# Patient Record
Sex: FEMALE | Race: WHITE | NOT HISPANIC OR LATINO | Employment: UNEMPLOYED | URBAN - NONMETROPOLITAN AREA
[De-identification: names, ages, dates, MRNs, and addresses within clinical notes are randomized per-mention and may not be internally consistent; named-entity substitution may affect disease eponyms.]

---

## 2019-02-11 ENCOUNTER — OFFICE VISIT (OUTPATIENT)
Dept: CARDIOLOGY | Facility: CLINIC | Age: 59
End: 2019-02-11

## 2019-02-11 VITALS
WEIGHT: 153.5 LBS | OXYGEN SATURATION: 98 % | BODY MASS INDEX: 27.2 KG/M2 | SYSTOLIC BLOOD PRESSURE: 176 MMHG | HEART RATE: 93 BPM | HEIGHT: 63 IN | DIASTOLIC BLOOD PRESSURE: 77 MMHG

## 2019-02-11 DIAGNOSIS — I73.9 PERIPHERAL VASCULAR DISEASE (HCC): Primary | ICD-10-CM

## 2019-02-11 PROCEDURE — 99204 OFFICE O/P NEW MOD 45 MIN: CPT | Performed by: INTERNAL MEDICINE

## 2019-02-11 RX ORDER — ASPIRIN 81 MG/1
TABLET ORAL
Refills: 1 | Status: ON HOLD | COMMUNITY
Start: 2019-01-28 | End: 2019-02-14

## 2019-02-11 RX ORDER — ASCORBIC ACID 500 MG
TABLET ORAL 2 TIMES DAILY
Refills: 0 | COMMUNITY
Start: 2019-02-01

## 2019-02-11 NOTE — PROGRESS NOTES
[facility] CARDIOLOGY  [address]  [place] [address]  Phone: [phone]  Fax: [phone]    02/11/2019    Chief Complaint   Patient presents with   • Peripheral Vascular Disease        History:   Irish Figueroa is a 58 y.o. female seen in follow-up for PAD and claudication. I previously treated this patient at [facility] about two years ago. Dr. Ewing advised her to come back because she has a wound on the bottom of her left foot and it is not wanting to heal completely.   She had prior procedure on the right SFA at [facility] which we will obtain op report.     Past Medical History:   Diagnosis Date   • Hypertension        No past surgical history on file.     Past Social History:  Social History     Socioeconomic History   • Marital status: Single     Spouse name: Not on file   • Number of children: Not on file   • Years of education: Not on file   • Highest education level: Not on file   Tobacco Use   • Smoking status: Never Smoker   • Smokeless tobacco: Never Used   Substance and Sexual Activity   • Alcohol use: No     Frequency: Never   • Drug use: No   • Sexual activity: Defer       Past Family History:  Family History   Problem Relation Age of Onset   • Heart disease Mother    • Stroke Father    • Diabetes Maternal Grandmother    • Diabetes Paternal Grandmother        Review of Systems:   Please see HPI  Constitution: No chills, no rigors, no unexplained weight loss or weight gain  Eyes:  No diplopia, no blurred vision, no loss of vision, conjunctiva is pink and sclera is anicteric  ENT:  No tinnitus, no otorrhea, no epistaxis, no sore throat   Respiratory: No cough, no hemoptysis  Cardiovascular: see HPI  Gastrointestinal: No nausea, no vomiting, no hematemesis, no diarrhea or constipation, no melena  Genitourinary: No frequency of dysuria no hematuria  Integument: No pruritis and  no skin rash  Hematologic / Lymphatic: No excessive bleeding, easy bruising, fatigue,  lymphadenopathy and petechiae  Musculoskeletal: No joint pain, joint stiffness, joint swelling, muscle pain, muscle weakness and neck pain  Neurological: No dizziness, headaches, light headedness, seizures and vertigo  Endocrine: No frequent urination and nocturia, temperature intolerance, weight gain, unintended and weight loss, unintended      Current Outpatient Medications   Medication Sig Dispense Refill   • ASPIRIN ADULT LOW STRENGTH 81 MG EC tablet   1   • vitamin C (ASCORBIC ACID) 500 MG tablet   0     No current facility-administered medications for this visit.         Allergies   Allergen Reactions   • Ampicillin Hives   • Codeine Hives       Objective:  Vitals:    02/11/19 1522   BP: 176/77   Pulse: 93   SpO2: 98%     Comfortable NAD  PERRL, conjunctiva clear  Neck supple, no JVD or thyromegaly appreciated  S1/S2 RRR, no m/r/g  Lungs CTA B, normal effort  Abdomen S/NT/ND (+) BS, no HSM appreciated  Extremities warm, no clubbing, cyanosis, or edema  Normal gait  No visible or palpable skin lesions  A/Ox4, mood and affect appropriate  Pulse exam:    Feet are warm bilateral  1+ edema bilateral  Capillary refill is normal  No evidence of ulceration or color change of the toes  PULSES  Right DP and PT are 1+ and Left DP and PT are 2+    DATA:             Procedures       A/P:  Severe PAD with ischemic ulcer on the left foot - critical limb ischemia  Prev hx of right SFA intervention 2 yr ago  Essential Hypertension    Plan  Schedule peripheral angiogram and runoff with intention to treat.    Patient's Body mass index is 27.19 kg/m². BMI is within normal parameters. No follow-up required..       No diagnosis found.     Thank you for allowing me to participate in the care of Irish Figueroa. Feel free to contact me directly with any further questions or concerns.

## 2019-02-11 NOTE — H&P (VIEW-ONLY)
[facility] CARDIOLOGY  [address]  [place] [address]  Phone: [phone]  Fax: [phone]    02/11/2019    Chief Complaint   Patient presents with   • Peripheral Vascular Disease        History:   Irish Figueroa is a 58 y.o. female seen in follow-up for PAD and claudication. I previously treated this patient at [place] about two years ago. Dr. Ewing advised her to come back because she has a wound on the bottom of her left foot and it is not wanting to heal completely.   She had prior procedure on the right SFA at [facility] which we will obtain op report.     Past Medical History:   Diagnosis Date   • Hypertension        No past surgical history on file.     Past Social History:  Social History     Socioeconomic History   • Marital status: Single     Spouse name: Not on file   • Number of children: Not on file   • Years of education: Not on file   • Highest education level: Not on file   Tobacco Use   • Smoking status: Never Smoker   • Smokeless tobacco: Never Used   Substance and Sexual Activity   • Alcohol use: No     Frequency: Never   • Drug use: No   • Sexual activity: Defer       Past Family History:  Family History   Problem Relation Age of Onset   • Heart disease Mother    • Stroke Father    • Diabetes Maternal Grandmother    • Diabetes Paternal Grandmother        Review of Systems:   Please see HPI  Constitution: No chills, no rigors, no unexplained weight loss or weight gain  Eyes:  No diplopia, no blurred vision, no loss of vision, conjunctiva is pink and sclera is anicteric  ENT:  No tinnitus, no otorrhea, no epistaxis, no sore throat   Respiratory: No cough, no hemoptysis  Cardiovascular: see HPI  Gastrointestinal: No nausea, no vomiting, no hematemesis, no diarrhea or constipation, no melena  Genitourinary: No frequency of dysuria no hematuria  Integument: No pruritis and  no skin rash  Hematologic / Lymphatic: No excessive bleeding, easy bruising, fatigue,  lymphadenopathy and petechiae  Musculoskeletal: No joint pain, joint stiffness, joint swelling, muscle pain, muscle weakness and neck pain  Neurological: No dizziness, headaches, light headedness, seizures and vertigo  Endocrine: No frequent urination and nocturia, temperature intolerance, weight gain, unintended and weight loss, unintended      Current Outpatient Medications   Medication Sig Dispense Refill   • ASPIRIN ADULT LOW STRENGTH 81 MG EC tablet   1   • vitamin C (ASCORBIC ACID) 500 MG tablet   0     No current facility-administered medications for this visit.         Allergies   Allergen Reactions   • Ampicillin Hives   • Codeine Hives       Objective:  Vitals:    02/11/19 1522   BP: 176/77   Pulse: 93   SpO2: 98%     Comfortable NAD  PERRL, conjunctiva clear  Neck supple, no JVD or thyromegaly appreciated  S1/S2 RRR, no m/r/g  Lungs CTA B, normal effort  Abdomen S/NT/ND (+) BS, no HSM appreciated  Extremities warm, no clubbing, cyanosis, or edema  Normal gait  No visible or palpable skin lesions  A/Ox4, mood and affect appropriate  Pulse exam:    Feet are warm bilateral  1+ edema bilateral  Capillary refill is normal  No evidence of ulceration or color change of the toes  PULSES  Right DP and PT are 1+ and Left DP and PT are 2+    DATA:             Procedures       A/P:  Severe PAD with ischemic ulcer on the left foot - critical limb ischemia  Prev hx of right SFA intervention 2 yr ago  Essential Hypertension    Plan  Schedule peripheral angiogram and runoff with intention to treat.    Patient's Body mass index is 27.19 kg/m². BMI is within normal parameters. No follow-up required..       No diagnosis found.     Thank you for allowing me to participate in the care of Irish Figueroa. Feel free to contact me directly with any further questions or concerns.

## 2019-02-13 PROBLEM — I73.9 PERIPHERAL VASCULAR DISEASE (HCC): Status: ACTIVE | Noted: 2019-02-13

## 2019-02-14 ENCOUNTER — HOSPITAL ENCOUNTER (OUTPATIENT)
Facility: HOSPITAL | Age: 59
Discharge: HOME | End: 2019-02-15
Attending: INTERNAL MEDICINE | Admitting: INTERNAL MEDICINE

## 2019-02-14 DIAGNOSIS — I73.9 PERIPHERAL VASCULAR DISEASE (HCC): ICD-10-CM

## 2019-02-14 DIAGNOSIS — E83.42 HYPOMAGNESEMIA: ICD-10-CM

## 2019-02-14 DIAGNOSIS — E87.6 HYPOKALEMIA: Primary | ICD-10-CM

## 2019-02-14 LAB
A-A DO2: 33.6 MMHG (ref 0–300)
ACT BLD: 169 SECONDS (ref 82–152)
ALBUMIN SERPL-MCNC: 3.5 G/DL (ref 3.5–5)
ALBUMIN/GLOB SERPL: 0.9 G/DL (ref 1.5–2.5)
ALP SERPL-CCNC: 148 U/L (ref 35–104)
ALT SERPL W P-5'-P-CCNC: 23 U/L (ref 10–36)
ANION GAP SERPL CALCULATED.3IONS-SCNC: 6.7 MMOL/L (ref 3.6–11.2)
ANION GAP SERPL CALCULATED.3IONS-SCNC: 8.9 MMOL/L (ref 3.6–11.2)
ARTERIAL PATENCY WRIST A: POSITIVE
AST SERPL-CCNC: 25 U/L (ref 10–30)
ATMOSPHERIC PRESS: 728 MMHG
BASE EXCESS BLDA CALC-SCNC: 5.3 MMOL/L
BASOPHILS # BLD AUTO: 0.04 10*3/MM3 (ref 0–0.3)
BASOPHILS NFR BLD AUTO: 0.4 % (ref 0–2)
BDY SITE: ABNORMAL
BILIRUB SERPL-MCNC: 0.5 MG/DL (ref 0.2–1.8)
BODY TEMPERATURE: 98.6 C
BUN BLD-MCNC: 8 MG/DL (ref 7–21)
BUN BLD-MCNC: 9 MG/DL (ref 7–21)
BUN/CREAT SERPL: 11.4 (ref 7–25)
BUN/CREAT SERPL: 9.9 (ref 7–25)
CALCIUM SPEC-SCNC: 8.8 MG/DL (ref 7.7–10)
CALCIUM SPEC-SCNC: 9.3 MG/DL (ref 7.7–10)
CHLORIDE SERPL-SCNC: 95 MMOL/L (ref 99–112)
CHLORIDE SERPL-SCNC: 95 MMOL/L (ref 99–112)
CO2 SERPL-SCNC: 31.3 MMOL/L (ref 24.3–31.9)
CO2 SERPL-SCNC: 33.1 MMOL/L (ref 24.3–31.9)
COHGB MFR BLD: 1.1 % (ref 0–5)
CREAT BLD-MCNC: 0.79 MG/DL (ref 0.43–1.29)
CREAT BLD-MCNC: 0.81 MG/DL (ref 0.43–1.29)
DEPRECATED RDW RBC AUTO: 39.9 FL (ref 37–54)
EOSINOPHIL # BLD AUTO: 0.4 10*3/MM3 (ref 0–0.7)
EOSINOPHIL NFR BLD AUTO: 3.9 % (ref 0–5)
ERYTHROCYTE [DISTWIDTH] IN BLOOD BY AUTOMATED COUNT: 12.4 % (ref 11.5–14.5)
GFR SERPL CREATININE-BSD FRML MDRD: 73 ML/MIN/1.73
GFR SERPL CREATININE-BSD FRML MDRD: 75 ML/MIN/1.73
GLOBULIN UR ELPH-MCNC: 3.7 GM/DL
GLUCOSE BLD-MCNC: 271 MG/DL (ref 70–110)
GLUCOSE BLD-MCNC: 380 MG/DL (ref 70–110)
GLUCOSE BLDC GLUCOMTR-MCNC: 283 MG/DL (ref 70–130)
HBA1C MFR BLD: 9.2 % (ref 4.5–5.7)
HCG INTACT+B SERPL-ACNC: <2 MIU/ML (ref 0–5)
HCO3 BLDA-SCNC: 28.9 MMOL/L (ref 22–26)
HCT VFR BLD AUTO: 34.6 % (ref 37–47)
HCT VFR BLD CALC: 31 % (ref 37–47)
HGB BLD-MCNC: 11.4 G/DL (ref 12–16)
HGB BLDA-MCNC: 10.7 G/DL (ref 12–16)
HOROWITZ INDEX BLD+IHG-RTO: 21 %
IMM GRANULOCYTES # BLD AUTO: 0.03 10*3/MM3 (ref 0–0.03)
IMM GRANULOCYTES NFR BLD AUTO: 0.3 % (ref 0–0.5)
LYMPHOCYTES # BLD AUTO: 1.65 10*3/MM3 (ref 1–3)
LYMPHOCYTES NFR BLD AUTO: 16.1 % (ref 21–51)
MAGNESIUM SERPL-MCNC: 1.5 MG/DL (ref 1.7–2.6)
MCH RBC QN AUTO: 29.6 PG (ref 27–33)
MCHC RBC AUTO-ENTMCNC: 32.9 G/DL (ref 33–37)
MCV RBC AUTO: 89.9 FL (ref 80–94)
METHGB BLD QL: 0.2 % (ref 0–3)
MODALITY: ABNORMAL
MONOCYTES # BLD AUTO: 0.59 10*3/MM3 (ref 0.1–0.9)
MONOCYTES NFR BLD AUTO: 5.7 % (ref 0–10)
NEUTROPHILS # BLD AUTO: 7.57 10*3/MM3 (ref 1.4–6.5)
NEUTROPHILS NFR BLD AUTO: 73.6 % (ref 30–70)
OSMOLALITY SERPL CALC.SUM OF ELEC: 280.7 MOSM/KG (ref 273–305)
OSMOLALITY SERPL CALC.SUM OF ELEC: 281.7 MOSM/KG (ref 273–305)
OXYHGB MFR BLDV: 91.7 % (ref 85–100)
PCO2 BLDA: 38.6 MM HG (ref 35–45)
PH BLDA: 7.49 PH UNITS (ref 7.35–7.45)
PLATELET # BLD AUTO: 389 10*3/MM3 (ref 130–400)
PMV BLD AUTO: 9 FL (ref 6–10)
PO2 BLDA: 63.2 MM HG (ref 80–100)
POTASSIUM BLD-SCNC: 2.6 MMOL/L (ref 3.5–5.3)
POTASSIUM BLD-SCNC: 2.8 MMOL/L (ref 3.5–5.3)
POTASSIUM BLD-SCNC: 3.3 MMOL/L (ref 3.5–5.3)
PROT SERPL-MCNC: 7.2 G/DL (ref 6–8)
RBC # BLD AUTO: 3.85 10*6/MM3 (ref 4.2–5.4)
SAO2 % BLDCOA: 92.9 % (ref 90–100)
SODIUM BLD-SCNC: 133 MMOL/L (ref 135–153)
SODIUM BLD-SCNC: 137 MMOL/L (ref 135–153)
WBC NRBC COR # BLD: 10.28 10*3/MM3 (ref 4.5–12.5)

## 2019-02-14 PROCEDURE — C1751 CATH, INF, PER/CENT/MIDLINE: HCPCS | Performed by: INTERNAL MEDICINE

## 2019-02-14 PROCEDURE — C1714 CATH, TRANS ATHERECTOMY, DIR: HCPCS | Performed by: INTERNAL MEDICINE

## 2019-02-14 PROCEDURE — 84702 CHORIONIC GONADOTROPIN TEST: CPT | Performed by: INTERNAL MEDICINE

## 2019-02-14 PROCEDURE — C2623 CATH, TRANSLUMIN, DRUG-COAT: HCPCS | Performed by: INTERNAL MEDICINE

## 2019-02-14 PROCEDURE — 99220 PR INITIAL OBSERVATION CARE/DAY 70 MINUTES: CPT | Performed by: INTERNAL MEDICINE

## 2019-02-14 PROCEDURE — 36600 WITHDRAWAL OF ARTERIAL BLOOD: CPT | Performed by: INTERNAL MEDICINE

## 2019-02-14 PROCEDURE — C1769 GUIDE WIRE: HCPCS | Performed by: INTERNAL MEDICINE

## 2019-02-14 PROCEDURE — 25010000002 HEPARIN (PORCINE) PER 1000 UNITS: Performed by: INTERNAL MEDICINE

## 2019-02-14 PROCEDURE — 75625 CONTRAST EXAM ABDOMINL AORTA: CPT | Performed by: INTERNAL MEDICINE

## 2019-02-14 PROCEDURE — 25010000002 MAGNESIUM SULFATE 2 GM/50ML SOLUTION: Performed by: INTERNAL MEDICINE

## 2019-02-14 PROCEDURE — 83050 HGB METHEMOGLOBIN QUAN: CPT | Performed by: INTERNAL MEDICINE

## 2019-02-14 PROCEDURE — C1894 INTRO/SHEATH, NON-LASER: HCPCS | Performed by: INTERNAL MEDICINE

## 2019-02-14 PROCEDURE — 85025 COMPLETE CBC W/AUTO DIFF WBC: CPT | Performed by: INTERNAL MEDICINE

## 2019-02-14 PROCEDURE — 84132 ASSAY OF SERUM POTASSIUM: CPT | Performed by: INTERNAL MEDICINE

## 2019-02-14 PROCEDURE — 85347 COAGULATION TIME ACTIVATED: CPT

## 2019-02-14 PROCEDURE — 82375 ASSAY CARBOXYHB QUANT: CPT | Performed by: INTERNAL MEDICINE

## 2019-02-14 PROCEDURE — C1760 CLOSURE DEV, VASC: HCPCS | Performed by: INTERNAL MEDICINE

## 2019-02-14 PROCEDURE — 25010000002 FENTANYL CITRATE (PF) 100 MCG/2ML SOLUTION: Performed by: INTERNAL MEDICINE

## 2019-02-14 PROCEDURE — C1887 CATHETER, GUIDING: HCPCS | Performed by: INTERNAL MEDICINE

## 2019-02-14 PROCEDURE — 82805 BLOOD GASES W/O2 SATURATION: CPT | Performed by: INTERNAL MEDICINE

## 2019-02-14 PROCEDURE — 37225 PR REVSC OPN/PRQ FEM/POP W/ATHRC/ANGIOP SM VSL: CPT | Performed by: INTERNAL MEDICINE

## 2019-02-14 PROCEDURE — 25010000003 POTASSIUM CHLORIDE 10 MEQ/100ML SOLUTION: Performed by: INTERNAL MEDICINE

## 2019-02-14 PROCEDURE — 83036 HEMOGLOBIN GLYCOSYLATED A1C: CPT | Performed by: PHYSICIAN ASSISTANT

## 2019-02-14 PROCEDURE — 63710000001 INSULIN DETEMIR PER 5 UNITS: Performed by: INTERNAL MEDICINE

## 2019-02-14 PROCEDURE — 84244 ASSAY OF RENIN: CPT | Performed by: INTERNAL MEDICINE

## 2019-02-14 PROCEDURE — 75716 ARTERY X-RAYS ARMS/LEGS: CPT | Performed by: INTERNAL MEDICINE

## 2019-02-14 PROCEDURE — 63710000001 INSULIN ASPART PER 5 UNITS: Performed by: PHYSICIAN ASSISTANT

## 2019-02-14 PROCEDURE — 93010 ELECTROCARDIOGRAM REPORT: CPT | Performed by: INTERNAL MEDICINE

## 2019-02-14 PROCEDURE — 25010000002 MIDAZOLAM PER 1 MG: Performed by: INTERNAL MEDICINE

## 2019-02-14 PROCEDURE — C1884 EMBOLIZATION PROTECT SYST: HCPCS | Performed by: INTERNAL MEDICINE

## 2019-02-14 PROCEDURE — 82962 GLUCOSE BLOOD TEST: CPT

## 2019-02-14 PROCEDURE — 80053 COMPREHEN METABOLIC PANEL: CPT | Performed by: INTERNAL MEDICINE

## 2019-02-14 PROCEDURE — 93005 ELECTROCARDIOGRAM TRACING: CPT | Performed by: PHYSICIAN ASSISTANT

## 2019-02-14 PROCEDURE — 83735 ASSAY OF MAGNESIUM: CPT | Performed by: INTERNAL MEDICINE

## 2019-02-14 PROCEDURE — G0378 HOSPITAL OBSERVATION PER HR: HCPCS

## 2019-02-14 PROCEDURE — 82088 ASSAY OF ALDOSTERONE: CPT | Performed by: INTERNAL MEDICINE

## 2019-02-14 RX ORDER — IBUPROFEN 400 MG/1
600 TABLET ORAL AS NEEDED
Status: ON HOLD | COMMUNITY
End: 2019-02-14

## 2019-02-14 RX ORDER — TRAMADOL HYDROCHLORIDE 50 MG/1
50 TABLET ORAL 3 TIMES DAILY
COMMUNITY

## 2019-02-14 RX ORDER — SODIUM CHLORIDE 9 MG/ML
100 INJECTION, SOLUTION INTRAVENOUS ONCE
Status: DISCONTINUED | OUTPATIENT
Start: 2019-02-14 | End: 2019-02-14

## 2019-02-14 RX ORDER — ROSUVASTATIN CALCIUM 10 MG/1
10 TABLET, COATED ORAL DAILY
Refills: 1 | COMMUNITY
Start: 2019-01-28

## 2019-02-14 RX ORDER — LIDOCAINE HYDROCHLORIDE 20 MG/ML
INJECTION, SOLUTION INFILTRATION; PERINEURAL AS NEEDED
Status: DISCONTINUED | OUTPATIENT
Start: 2019-02-14 | End: 2019-02-14 | Stop reason: HOSPADM

## 2019-02-14 RX ORDER — AMLODIPINE BESYLATE 10 MG/1
10 TABLET ORAL DAILY
COMMUNITY

## 2019-02-14 RX ORDER — INSULIN GLARGINE 100 [IU]/ML
30 INJECTION, SOLUTION SUBCUTANEOUS DAILY
Status: ON HOLD | COMMUNITY
End: 2019-02-14

## 2019-02-14 RX ORDER — METAXALONE 800 MG/1
400 TABLET ORAL 2 TIMES DAILY
Status: ON HOLD | COMMUNITY
End: 2019-02-14

## 2019-02-14 RX ORDER — MAGNESIUM SULFATE HEPTAHYDRATE 40 MG/ML
4 INJECTION, SOLUTION INTRAVENOUS AS NEEDED
Status: DISCONTINUED | OUTPATIENT
Start: 2019-02-14 | End: 2019-02-15 | Stop reason: HOSPADM

## 2019-02-14 RX ORDER — HEPARIN SODIUM 5000 [USP'U]/ML
5000 INJECTION, SOLUTION INTRAVENOUS; SUBCUTANEOUS EVERY 12 HOURS SCHEDULED
Status: DISCONTINUED | OUTPATIENT
Start: 2019-02-14 | End: 2019-02-15 | Stop reason: HOSPADM

## 2019-02-14 RX ORDER — MAGNESIUM SULFATE HEPTAHYDRATE 40 MG/ML
2 INJECTION, SOLUTION INTRAVENOUS ONCE
Status: COMPLETED | OUTPATIENT
Start: 2019-02-14 | End: 2019-02-15

## 2019-02-14 RX ORDER — TRAMADOL HYDROCHLORIDE 50 MG/1
50 TABLET ORAL 3 TIMES DAILY
Status: DISCONTINUED | OUTPATIENT
Start: 2019-02-14 | End: 2019-02-14

## 2019-02-14 RX ORDER — INSULIN DETEMIR 100 [IU]/ML
10 INJECTION, SOLUTION SUBCUTANEOUS DAILY
Refills: 1 | Status: ON HOLD | COMMUNITY
Start: 2018-11-07 | End: 2019-02-15

## 2019-02-14 RX ORDER — MAGNESIUM SULFATE HEPTAHYDRATE 40 MG/ML
2 INJECTION, SOLUTION INTRAVENOUS AS NEEDED
Status: DISCONTINUED | OUTPATIENT
Start: 2019-02-14 | End: 2019-02-15 | Stop reason: HOSPADM

## 2019-02-14 RX ORDER — FUROSEMIDE 20 MG/1
20 TABLET ORAL DAILY
Refills: 1 | COMMUNITY
Start: 2019-01-28 | End: 2019-02-15 | Stop reason: HOSPADM

## 2019-02-14 RX ORDER — LEVOFLOXACIN 750 MG/1
TABLET ORAL
Refills: 2 | Status: ON HOLD | COMMUNITY
Start: 2019-01-28 | End: 2019-02-14

## 2019-02-14 RX ORDER — IBUPROFEN 600 MG/1
TABLET ORAL
Refills: 1 | Status: ON HOLD | COMMUNITY
Start: 2019-01-28 | End: 2019-02-14

## 2019-02-14 RX ORDER — FENTANYL CITRATE 50 UG/ML
INJECTION, SOLUTION INTRAMUSCULAR; INTRAVENOUS AS NEEDED
Status: DISCONTINUED | OUTPATIENT
Start: 2019-02-14 | End: 2019-02-14 | Stop reason: HOSPADM

## 2019-02-14 RX ORDER — POTASSIUM CHLORIDE 750 MG/1
TABLET, FILM COATED, EXTENDED RELEASE ORAL AS NEEDED
Status: DISCONTINUED | OUTPATIENT
Start: 2019-02-14 | End: 2019-02-14 | Stop reason: HOSPADM

## 2019-02-14 RX ORDER — ASPIRIN 81 MG/1
81 TABLET ORAL DAILY
COMMUNITY

## 2019-02-14 RX ORDER — CILOSTAZOL 100 MG/1
TABLET ORAL
Refills: 1 | Status: ON HOLD | COMMUNITY
Start: 2019-01-28 | End: 2019-02-14

## 2019-02-14 RX ORDER — POTASSIUM CHLORIDE 750 MG/1
40 CAPSULE, EXTENDED RELEASE ORAL AS NEEDED
Status: DISCONTINUED | OUTPATIENT
Start: 2019-02-14 | End: 2019-02-14

## 2019-02-14 RX ORDER — POTASSIUM CHLORIDE 1.5 G/1.77G
40 POWDER, FOR SOLUTION ORAL EVERY 4 HOURS
Status: COMPLETED | OUTPATIENT
Start: 2019-02-14 | End: 2019-02-15

## 2019-02-14 RX ORDER — POTASSIUM CHLORIDE 7.45 MG/ML
10 INJECTION INTRAVENOUS
Status: DISCONTINUED | OUTPATIENT
Start: 2019-02-14 | End: 2019-02-14

## 2019-02-14 RX ORDER — HYDROCODONE BITARTRATE AND ACETAMINOPHEN 5; 325 MG/1; MG/1
1 TABLET ORAL 2 TIMES DAILY
Status: DISCONTINUED | OUTPATIENT
Start: 2019-02-14 | End: 2019-02-14

## 2019-02-14 RX ORDER — IBUPROFEN 800 MG/1
800 TABLET ORAL EVERY 6 HOURS PRN
Status: ON HOLD | COMMUNITY
End: 2019-02-15

## 2019-02-14 RX ORDER — ASPIRIN 81 MG/1
81 TABLET ORAL DAILY
Status: DISCONTINUED | OUTPATIENT
Start: 2019-02-15 | End: 2019-02-15 | Stop reason: HOSPADM

## 2019-02-14 RX ORDER — POTASSIUM CHLORIDE 20 MEQ/1
40 TABLET, EXTENDED RELEASE ORAL EVERY 4 HOURS
Status: DISPENSED | OUTPATIENT
Start: 2019-02-14 | End: 2019-02-15

## 2019-02-14 RX ORDER — MIDAZOLAM HYDROCHLORIDE 1 MG/ML
INJECTION INTRAMUSCULAR; INTRAVENOUS AS NEEDED
Status: DISCONTINUED | OUTPATIENT
Start: 2019-02-14 | End: 2019-02-14 | Stop reason: HOSPADM

## 2019-02-14 RX ORDER — NICOTINE POLACRILEX 4 MG
15 LOZENGE BUCCAL
Status: DISCONTINUED | OUTPATIENT
Start: 2019-02-14 | End: 2019-02-15 | Stop reason: HOSPADM

## 2019-02-14 RX ORDER — POTASSIUM CHLORIDE 20 MEQ/1
40 TABLET, EXTENDED RELEASE ORAL AS NEEDED
Status: DISCONTINUED | OUTPATIENT
Start: 2019-02-14 | End: 2019-02-15 | Stop reason: HOSPADM

## 2019-02-14 RX ORDER — POTASSIUM CHLORIDE 7.45 MG/ML
10 INJECTION INTRAVENOUS
Status: DISCONTINUED | OUTPATIENT
Start: 2019-02-14 | End: 2019-02-15 | Stop reason: HOSPADM

## 2019-02-14 RX ORDER — AMLODIPINE BESYLATE 10 MG/1
10 TABLET ORAL DAILY
Status: DISCONTINUED | OUTPATIENT
Start: 2019-02-14 | End: 2019-02-14

## 2019-02-14 RX ORDER — GABAPENTIN 100 MG/1
100 CAPSULE ORAL EVERY 8 HOURS SCHEDULED
Status: DISCONTINUED | OUTPATIENT
Start: 2019-02-14 | End: 2019-02-15 | Stop reason: HOSPADM

## 2019-02-14 RX ORDER — EXENATIDE 2 MG/.65ML
INJECTION, SUSPENSION, EXTENDED RELEASE SUBCUTANEOUS
Refills: 1 | Status: ON HOLD | COMMUNITY
Start: 2019-01-14 | End: 2019-02-15

## 2019-02-14 RX ORDER — ASPIRIN 81 MG/1
81 TABLET ORAL DAILY
Status: DISCONTINUED | OUTPATIENT
Start: 2019-02-14 | End: 2019-02-14 | Stop reason: SDUPTHER

## 2019-02-14 RX ORDER — CLOPIDOGREL BISULFATE 75 MG/1
75 TABLET ORAL DAILY
Status: DISCONTINUED | OUTPATIENT
Start: 2019-02-15 | End: 2019-02-15 | Stop reason: HOSPADM

## 2019-02-14 RX ORDER — POTASSIUM CHLORIDE 1.5 G/1.77G
40 POWDER, FOR SOLUTION ORAL AS NEEDED
Status: DISCONTINUED | OUTPATIENT
Start: 2019-02-14 | End: 2019-02-15 | Stop reason: HOSPADM

## 2019-02-14 RX ORDER — LISINOPRIL 40 MG/1
40 TABLET ORAL DAILY
Status: ON HOLD | COMMUNITY
End: 2019-02-14

## 2019-02-14 RX ORDER — GLIPIZIDE 10 MG/1
10 TABLET ORAL DAILY
Refills: 1 | COMMUNITY
Start: 2019-01-28

## 2019-02-14 RX ORDER — HYDROCODONE BITARTRATE AND ACETAMINOPHEN 5; 325 MG/1; MG/1
1 TABLET ORAL 2 TIMES DAILY
COMMUNITY

## 2019-02-14 RX ORDER — POTASSIUM CHLORIDE 1.5 G/1.77G
40 POWDER, FOR SOLUTION ORAL AS NEEDED
Status: DISCONTINUED | OUTPATIENT
Start: 2019-02-14 | End: 2019-02-14

## 2019-02-14 RX ORDER — POTASSIUM CHLORIDE 750 MG/1
TABLET, FILM COATED, EXTENDED RELEASE ORAL
Refills: 1 | Status: ON HOLD | COMMUNITY
Start: 2019-01-28 | End: 2019-02-15

## 2019-02-14 RX ORDER — CILOSTAZOL 100 MG/1
100 TABLET ORAL 2 TIMES DAILY
Status: ON HOLD | COMMUNITY
End: 2019-02-15

## 2019-02-14 RX ORDER — ONDANSETRON 2 MG/ML
4 INJECTION INTRAMUSCULAR; INTRAVENOUS EVERY 6 HOURS PRN
Status: DISCONTINUED | OUTPATIENT
Start: 2019-02-14 | End: 2019-02-14

## 2019-02-14 RX ORDER — INSULIN LISPRO 100 U/ML
25-30 INJECTION, SOLUTION SUBCUTANEOUS
Refills: 1 | COMMUNITY
Start: 2019-01-28

## 2019-02-14 RX ORDER — METAXALONE 800 MG/1
400 TABLET ORAL 2 TIMES DAILY
Status: DISCONTINUED | OUTPATIENT
Start: 2019-02-14 | End: 2019-02-14

## 2019-02-14 RX ORDER — HYDROXYZINE HYDROCHLORIDE 25 MG/1
TABLET, FILM COATED ORAL
Refills: 1 | Status: ON HOLD | COMMUNITY
Start: 2019-01-28 | End: 2019-02-15

## 2019-02-14 RX ORDER — GLIPIZIDE 5 MG/1
5 TABLET ORAL
Status: DISCONTINUED | OUTPATIENT
Start: 2019-02-14 | End: 2019-02-14

## 2019-02-14 RX ORDER — HEPARIN SODIUM 1000 [USP'U]/ML
INJECTION, SOLUTION INTRAVENOUS; SUBCUTANEOUS AS NEEDED
Status: DISCONTINUED | OUTPATIENT
Start: 2019-02-14 | End: 2019-02-14 | Stop reason: HOSPADM

## 2019-02-14 RX ORDER — CLINDAMYCIN HYDROCHLORIDE 300 MG/1
CAPSULE ORAL
Refills: 0 | Status: ON HOLD | COMMUNITY
Start: 2019-01-08 | End: 2019-02-14

## 2019-02-14 RX ORDER — METAXALONE 800 MG/1
400 TABLET ORAL 2 TIMES DAILY PRN
Status: DISCONTINUED | OUTPATIENT
Start: 2019-02-14 | End: 2019-02-15 | Stop reason: HOSPADM

## 2019-02-14 RX ORDER — SODIUM CHLORIDE 0.9 % (FLUSH) 0.9 %
3-10 SYRINGE (ML) INJECTION AS NEEDED
Status: DISCONTINUED | OUTPATIENT
Start: 2019-02-14 | End: 2019-02-15 | Stop reason: HOSPADM

## 2019-02-14 RX ORDER — PANTOPRAZOLE SODIUM 40 MG/1
TABLET, DELAYED RELEASE ORAL
Refills: 1 | Status: ON HOLD | COMMUNITY
Start: 2019-01-28 | End: 2019-02-14

## 2019-02-14 RX ORDER — SODIUM CHLORIDE 0.9 % (FLUSH) 0.9 %
3 SYRINGE (ML) INJECTION EVERY 12 HOURS SCHEDULED
Status: DISCONTINUED | OUTPATIENT
Start: 2019-02-14 | End: 2019-02-15 | Stop reason: HOSPADM

## 2019-02-14 RX ORDER — NITROGLYCERIN 0.4 MG/1
0.4 TABLET SUBLINGUAL
Status: DISCONTINUED | OUTPATIENT
Start: 2019-02-14 | End: 2019-02-15 | Stop reason: HOSPADM

## 2019-02-14 RX ORDER — INSULIN GLARGINE 100 [IU]/ML
60-65 INJECTION, SOLUTION SUBCUTANEOUS 2 TIMES DAILY
Refills: 1 | COMMUNITY
Start: 2019-01-28

## 2019-02-14 RX ORDER — TRAZODONE HYDROCHLORIDE 50 MG/1
50 TABLET ORAL NIGHTLY
COMMUNITY

## 2019-02-14 RX ORDER — HYDROCODONE BITARTRATE AND ACETAMINOPHEN 5; 325 MG/1; MG/1
1 TABLET ORAL 2 TIMES DAILY PRN
Status: DISCONTINUED | OUTPATIENT
Start: 2019-02-14 | End: 2019-02-15 | Stop reason: HOSPADM

## 2019-02-14 RX ORDER — POTASSIUM CHLORIDE 20 MEQ/1
40 TABLET, EXTENDED RELEASE ORAL ONCE
Status: DISCONTINUED | OUTPATIENT
Start: 2019-02-14 | End: 2019-02-15 | Stop reason: HOSPADM

## 2019-02-14 RX ORDER — FLURBIPROFEN SODIUM 0.3 MG/ML
SOLUTION/ DROPS OPHTHALMIC
Refills: 5 | Status: ON HOLD | COMMUNITY
Start: 2019-01-28 | End: 2019-02-14

## 2019-02-14 RX ORDER — ESTROGENS, CONJUGATED 0.9 MG
0.9 TABLET ORAL DAILY
Refills: 1 | COMMUNITY
Start: 2019-02-01

## 2019-02-14 RX ORDER — ROSUVASTATIN CALCIUM 10 MG/1
10 TABLET, COATED ORAL DAILY
Status: DISCONTINUED | OUTPATIENT
Start: 2019-02-14 | End: 2019-02-15 | Stop reason: HOSPADM

## 2019-02-14 RX ORDER — DEXTROSE MONOHYDRATE 25 G/50ML
25 INJECTION, SOLUTION INTRAVENOUS
Status: DISCONTINUED | OUTPATIENT
Start: 2019-02-14 | End: 2019-02-15 | Stop reason: HOSPADM

## 2019-02-14 RX ORDER — METHOCARBAMOL 500 MG/1
500 TABLET, FILM COATED ORAL 2 TIMES DAILY
Refills: 1 | COMMUNITY
Start: 2019-01-28

## 2019-02-14 RX ORDER — TRAMADOL HYDROCHLORIDE 50 MG/1
50 TABLET ORAL 3 TIMES DAILY PRN
Status: DISCONTINUED | OUTPATIENT
Start: 2019-02-14 | End: 2019-02-15 | Stop reason: HOSPADM

## 2019-02-14 RX ORDER — GABAPENTIN 600 MG/1
600 TABLET ORAL 3 TIMES DAILY
COMMUNITY

## 2019-02-14 RX ORDER — AMLODIPINE BESYLATE 10 MG/1
10 TABLET ORAL DAILY
Status: DISCONTINUED | OUTPATIENT
Start: 2019-02-15 | End: 2019-02-15 | Stop reason: HOSPADM

## 2019-02-14 RX ADMIN — HEPARIN SODIUM 5000 UNITS: 5000 INJECTION INTRAVENOUS; SUBCUTANEOUS at 21:20

## 2019-02-14 RX ADMIN — INSULIN ASPART 4 UNITS: 100 INJECTION, SOLUTION INTRAVENOUS; SUBCUTANEOUS at 21:25

## 2019-02-14 RX ADMIN — GABAPENTIN 100 MG: 100 CAPSULE ORAL at 21:20

## 2019-02-14 RX ADMIN — POTASSIUM CHLORIDE 40 MEQ: 1.5 POWDER, FOR SOLUTION ORAL at 22:13

## 2019-02-14 RX ADMIN — MAGNESIUM SULFATE IN WATER 2 G: 40 INJECTION, SOLUTION INTRAVENOUS at 14:30

## 2019-02-14 RX ADMIN — INSULIN DETEMIR 10 UNITS: 100 INJECTION, SOLUTION SUBCUTANEOUS at 21:25

## 2019-02-14 RX ADMIN — POTASSIUM CHLORIDE 10 MEQ: 10 INJECTION, SOLUTION INTRAVENOUS at 12:51

## 2019-02-14 RX ADMIN — POTASSIUM CHLORIDE 10 MEQ: 10 INJECTION, SOLUTION INTRAVENOUS at 10:42

## 2019-02-14 NOTE — NURSING NOTE
After discussion with patient regarding electrolyte imbalances and medications patient states she failed to inform admitting RN of Lasix being a daily home medication.

## 2019-02-14 NOTE — NURSING NOTE
Notified patient's pharmacy to have a medication list faxed.   Notified Dr. Esquivel that patient states she takes Lasix daily at home.  Informed that a new medication list is being faxed and will be updated.

## 2019-02-14 NOTE — H&P
"     [facility] Medicine Services  HISTORY & PHYSICAL    Patient Identification:  Name:  Irish Figueroa  Age:  58 y.o.  Sex:  female  :  1960  MRN:  [id number]   Visit Number:  [id number]  Primary Care Physician:  Jordana Woo MD     I have seen the patient in conjunction with Gisell Gonzalez PA-C, and I agree with the following statements:    Subjective     Chief complaint:   No complaints  Admit for electrolyte abnormalities post arthrectomy of left leg  Pt: \"I'm feeling better\"    History of presenting illness:   Patient is a 58 y.o. female that underwent peripheral arteriogram with arthrectomy and balloon angioplasty of the left SFA distally per Dr. Esquivel earlier today for non healing ulceration on left heel. Patient had been treated for non healing ulceration of left heel by Dr. Ewing with podiatry, patient states she has been on multiple rounds of oral antibiotics and debridement creams, all of which have not helped her symptoms. Patient does have history of peripheral arterial disease, with balloon angioplasty in the past of right SFA (see report below). Patient tolerated the procedure earlier today well with no acute complications. Patient was placed in observation status on Hospitalist service as preoperative labs revealed electrolyte abnormalities. Patient was found to have potassium 2.6 and magnesium of 1.5. Dr. Esquivel with interventional cardiology consulted Dr. Cardenas with nephrology and electrolyte replacement was initiated.     Patient states that she is on lasix at home for lower extremity edema, she states she takes it daily and has only missed today's dose. She states she has been eating and drinking well, no issues with eating. Complete medication list pending per pharmacy. Pateint also has past medical history significant for type II diabetes mellitus, hypertension, and hyperlipidemia. She states that other than her left foot wound, she has been in good health. She takes " all of her medications as prescribed.     Currently, patient denies any complaints. States she is doing well, reports significant improvement in pain in her left lower leg. She denies any fevers or chills. Denies any chest pain or shortness of breath, denies any palpitations. She denies any abdominal pain, nausea, vomiting, or diarrhea. No urinary symptoms.     Patient has been placed in observation status on telemetry floor for further evaluation and treatment.   ---------------------------------------------------------------------------------------------------------------------   Review of Systems   Constitutional: Negative for activity change, appetite change, chills, diaphoresis, fatigue and fever.   HENT: Negative for congestion, postnasal drip, rhinorrhea, sinus pain, sore throat and trouble swallowing.    Eyes: Negative for discharge and visual disturbance.   Respiratory: Negative for cough, chest tightness, shortness of breath and wheezing.    Cardiovascular: Positive for leg swelling. Negative for chest pain and palpitations.   Gastrointestinal: Negative for abdominal pain, constipation, diarrhea, nausea and vomiting.   Endocrine: Negative for cold intolerance and heat intolerance.   Genitourinary: Negative for decreased urine volume, dysuria, frequency and urgency.   Musculoskeletal: Negative for arthralgias, gait problem and myalgias.   Skin: Positive for wound (left heel, malodorous ). Negative for color change and rash.   Allergic/Immunologic: Negative for environmental allergies and immunocompromised state.   Neurological: Negative for dizziness, syncope, weakness, light-headedness and headaches.   Hematological: Negative for adenopathy. Does not bruise/bleed easily.   Psychiatric/Behavioral: Negative for confusion and decreased concentration. The patient is not nervous/anxious.    ---------------------------------------------------------------------------------------------------------------------    Past Medical History:   Diagnosis Date   • Diabetes mellitus (CMS/[facility])    • Heart murmur    • Hyperlipidemia    • Hypertension      Past Surgical History:   Procedure Laterality Date   • APPENDECTOMY     • HYSTERECTOMY     • PERIPHERAL ARTERIAL STENT GRAFT Right 2016     Family History   Problem Relation Age of Onset   • Heart disease Mother    • Stroke Father    • Diabetes Maternal Grandmother    • Diabetes Paternal Grandmother      Social History     Socioeconomic History   • Marital status: Single   Tobacco Use   • Smoking status: Never Smoker   • Smokeless tobacco: Never Used   Substance and Sexual Activity   • Alcohol use: No     Frequency: Never   • Drug use: No   • Sexual activity: Defer     ---------------------------------------------------------------------------------------------------------------------   Allergies:  Ampicillin and Codeine  ---------------------------------------------------------------------------------------------------------------------   Medications below are reported home medications pulling from within the system; at this time, these medications have not been reconciled unless otherwise specified and are in the verification process for further verifcation as current home medications.    Prior to Admission Medications     Prescriptions Last Dose Informant Patient Reported? Taking?    amLODIPine (NORVASC) 10 MG tablet 2/14/2019 Self Yes Yes    Take 20 mg by mouth Daily.    gabapentin (NEURONTIN) 600 MG tablet 2/13/2019 Self Yes Yes    Take 600 mg by mouth 3 (Three) Times a Day.    HYDROcodone-acetaminophen (NORCO) 5-325 MG per tablet 2/13/2019 Self Yes Yes    Take 1 tablet by mouth 2 (Two) Times a Day.    traMADol (ULTRAM) 50 MG tablet 2/13/2019 Self Yes Yes    Take 50 mg by mouth 3 (Three) Times a Day.    traZODone (DESYREL) 50 MG tablet Past Week Self Yes Yes    Take 20 mg by mouth Every Night.    vitamin C (ASCORBIC ACID) 500 MG tablet 2/13/2019 Self Yes Yes    2 (Two) Times a  Day.    ADMELOG SOLOSTAR 100 UNIT/ML solution pen-injector   Yes No    aspirin 81 MG EC tablet   Yes No    Take 81 mg by mouth Daily.    B-D UF III MINI PEN NEEDLES 31G X 5 MM misc   Yes No    BYDUREON 2 MG pen-injector injection   Yes No    cilostazol (PLETAL) 100 MG tablet   Yes No    Take 100 mg by mouth 2 (Two) Times a Day.    diclofenac (VOLTAREN) 1 % gel gel   Yes No    APPLY 2 3 GRAMS TO THE AFFECTED AREA 2 3 TIMES DAILY AS NEEDED    furosemide (LASIX) 20 MG tablet   Yes No    glipiZIDE (GLUCOTROL) 10 MG tablet   Yes No    hydrOXYzine (ATARAX) 25 MG tablet   Yes No    ibuprofen (ADVIL,MOTRIN) 600 MG tablet   Yes No    Insulin Glargine (BASAGLAR KWIKPEN) 100 UNIT/ML injection pen   Yes No    LEVEMIR 100 UNIT/ML injection   Yes No    levoFLOXacin (LEVAQUIN) 750 MG tablet   Yes No    methocarbamol (ROBAXIN) 500 MG tablet   Yes No    potassium chloride (K-DUR) 10 MEQ CR tablet   Yes No    PREMARIN 0.9 MG tablet   Yes No    rosuvastatin (CRESTOR) 10 MG tablet   Yes No        Objective     Hospital Scheduled Meds:   [START ON 2/15/2019] amLODIPine 10 mg Oral Daily   aspirin 81 mg Oral Daily   [START ON 2/15/2019] clopidogrel 75 mg Oral Daily   gabapentin 100 mg Oral Q8H   heparin (porcine) 5,000 Units Subcutaneous Q12H   insulin aspart 0-7 Units Subcutaneous 4x Daily AC & at Bedtime   potassium chloride 40 mEq Oral Once   rosuvastatin 10 mg Oral Daily   sodium chloride 3 mL Intravenous Q12H     Current listed hospital scheduled medications may not yet reflect those currently placed in orders that are signed and held, awaiting patient's arrival to floor/unit.    ---------------------------------------------------------------------------------------------------------------------   Vital Signs:  Temp:  [99 °F (37.2 °C)] 99 °F (37.2 °C)  Heart Rate:  [88-99] 88  Resp:  [14-20] 20  BP: (166-188)/(73-85) 166/77  No data found.  SpO2 Percentage    02/14/19 1500 02/14/19 1600 02/14/19 1700   SpO2: 95% 97% 98%     SpO2:   [91 %-98 %] 98 %  on  Flow (L/min):  [2] 2;   Device (Oxygen Therapy): room air    Body mass index is 26.57 kg/m².  Wt Readings from Last 3 Encounters:   02/14/19 68 kg (150 lb)   02/11/19 69.6 kg (153 lb 8 oz)     ---------------------------------------------------------------------------------------------------------------------   Physical Exam:  Physical Exam   Constitutional: She is oriented to person, place, and time. She appears well-developed and well-nourished.  Non-toxic appearance. No distress.   HENT:   Head: Normocephalic and atraumatic.   Right Ear: External ear normal.   Left Ear: External ear normal.   Nose: Nose normal.   Mouth/Throat: Oropharynx is clear and moist and mucous membranes are normal. No oropharyngeal exudate.   Eyes: Conjunctivae and EOM are normal. Pupils are equal, round, and reactive to light. No scleral icterus.   Neck: Trachea normal and normal range of motion. Neck supple. No JVD present. No muscular tenderness present. Carotid bruit is not present. No tracheal deviation present. No thyromegaly present.   Cardiovascular: Normal rate, regular rhythm, normal heart sounds and intact distal pulses. Exam reveals no gallop and no friction rub.   No murmur heard.  Pulmonary/Chest: Effort normal and breath sounds normal. No respiratory distress. She has no wheezes. She has no rhonchi. She has no rales. She exhibits no tenderness.   Abdominal: Soft. Bowel sounds are normal. She exhibits no distension and no mass. There is no hepatosplenomegaly. There is no tenderness. There is no rebound and no guarding. No hernia.   Musculoskeletal: She exhibits no edema, tenderness or deformity.     Vascular Status -  Her right foot exhibits abnormal foot edema. Her right foot exhibits normal foot vasculature . Her left foot exhibits abnormal foot edema. Her left foot exhibits normal foot vasculature .     Neurological: She is alert and oriented to person, place, and time. She has normal strength. No  cranial nerve deficit or sensory deficit.   Skin: Skin is warm and dry. Capillary refill takes less than 2 seconds. No rash noted. No erythema. No pallor.   Psychiatric: She has a normal mood and affect. Her speech is normal and behavior is normal. Judgment and thought content normal.   Nursing note and vitals reviewed.    ---------------------------------------------------------------------------------------------------------------------  EKG:  Dr. Lombardi personally looked at the EKG and it shows NS with heart rate of 90, QTc 516 ms and no ischemic changes.  Ordered and pending when Gisell Gonzalez PA-C saw the patient.    Telemetry:    Patient not currently on telemetry when Gisell Gonzalez PA-C saw the patient.  Dr. Lombardi looked at the telemetry strips and they show normal sinus rhythm with heart rates 70-90's.   ---------------------------------------------------------------------------------------------------------------------             Results from last 7 days   Lab Units 02/14/19  0912   WBC 10*3/mm3 10.28   HEMOGLOBIN g/dL 11.4*   HEMATOCRIT % 34.6*   MCV fL 89.9   MCHC g/dL 32.9*   PLATELETS 10*3/mm3 389     Results from last 7 days   Lab Units 02/14/19  1210 02/14/19  0912   SODIUM mmol/L  --  137   POTASSIUM mmol/L 2.8* 2.6*   MAGNESIUM mg/dL 1.5*  --    CHLORIDE mmol/L  --  95*   CO2 mmol/L  --  33.1*   BUN mg/dL  --  8   CREATININE mg/dL  --  0.81   EGFR IF NONAFRICN AM mL/min/1.73  --  73   CALCIUM mg/dL  --  9.3   GLUCOSE mg/dL  --  271*   ALBUMIN g/dL  --  3.50   BILIRUBIN mg/dL  --  0.5   ALK PHOS U/L  --  148*   AST (SGOT) U/L  --  25   ALT (SGPT) U/L  --  23   Estimated Creatinine Clearance: 71.7 mL/min (by C-G formula based on SCr of 0.81 mg/dL).    I have personally reviewed the above laboratory results.   ---------------------------------------------------------------------------------------------------------------------  Imaging Results (last 7 days)      I have personally reviewed the  below radiology reports.     Last Echocardiogram (1/20/2017 -- [facility])    Peripheral arteriogram 1/20/2017 ([facility])      Peripheral arteriogram 2/14/2019 per Dr. Esquivel     ---------------------------------------------------------------------------------------------------------------------    Assessment & Plan      · Acute hypokalemia and hypomagnesemia: Replace per protocol. STAT EKG pending. Closely monitor for any arrhythmias on telemetry. Nephrology on the case,  input/assistance appreciated. Nephrology has ordered ABG, aldosterone, renin, urine potassium.   · Peripheral arterial disease s/p atherectomy and balloon angioplasty of left distal SFA 2/14/2019 in setting of non healing left heel arterial ulceration: Continue care per interventional cardiology, patient on aspirin and statin as well as Plavix. Wound care ordered.   · Essential hypertension: BP elevated on admission, plan to resume home BP medication regimen once reconciled per pharmacy. In the interim, will make PRN hydralazine available. Closely monitor BP per hospital protocol, titrate medications as necessary.   · Hyperlipemia: Continue statin.   · Insulin dependent type II diabetes mellitus with hyperglycemia: Hold any oral DM meds for now, home med list pending. Start SSI. A1C ordered to evaluate glycemic control. Closely monitor blood glucose levels with accuchecks QAC and QHS. Titrate insulin therapy as necessary.   · Mild normocytic anemia: H&H stable. Could be component of ABLA vs anemia of chronic disease. Closely monitor H&H, transfuse if hgb were to drop below 7.0. CBC in AM.  · F/E/N: No IV fluids, pt colleen oral intake. Replace electrolytes per protocol, CMP/Mag/Phos levels in AM. Cardiac consistent carbohydrate diet.     ---------------------------------------------------  DVT Prophylaxis: SQ Heparin   Activity: As tolerated     The patient is considered to be a high risk patient due to: Electrolyte abnormalities,  severe PAD, DM, HTN, HLD    OBSERVATION status, however if further evaluation or treatment plans warrant, status may change.  Based upon current information, I predict patient's care encounter to be less than or equal to 2 midnights.    Code Status: FULL CODE    I have discussed the patient's assessment and plan with the patient.       Gisell Gonzalez PA-C  Hospitalist Service -- [facility]   Pager: [phone]    02/14/19  6:37 PM    Attending Physician: Armando Lombardi MD      ---------------------------------------------------------------------------------------------------------------------     Brief Attending Note     I have seen and examined the patient, performing an independent face-to-face diagnostic evaluation at 9 in the PM.  The plan of care reviewed and developed with the advanced practice clinician (APC):  Gisell Gonzalez PA-C.    Brief Summary Statement/HPI:  59 yo that was a direct admission from the cath lab after arterectomy of the left distal superficial femoral artery due to low potassium and magnesium.  When I saw the patient, she was on the telemetry floor.  She currently denies chest pain, denies shortness of air, denies nausea, denies emesis, denies diarrhea.  She takes lasix only for leg swelling.  After the right SFA had angioplasty, she noted less swelling in that leg.  She was taking lasix everyday.  She has never been diagnosed with CHF and had a normal EF on a 2017 ECHO here.    Attending Physical Exam:  Constitutional:  Well-developed and well-nourished.  No respiratory distress.      HENT:  Head: Normocephalic and atraumatic.  Mouth:  Moist mucous membranes.    Eyes:  Conjunctivae and EOM are normal.  Pupils are equal, round, and reactive to light.  No scleral icterus.    Neck:  Neck supple.  No JVD present.    Cardiovascular:  Normal rate, regular rhythm and normal heart sounds with no murmur.  Pulmonary/Chest:  No respiratory distress, no wheezes, no crackles, with  normal breath sounds and good air movement.  Abdominal:  Soft.  Bowel sounds are normal.  No distension and no tenderness.   Musculoskeletal:  No edema on the right, minimal on the left, no tenderness, and no deformity.  No red or swollen joints anywhere.    Neurological:  Alert and oriented to person, place, and time.  No cranial nerve deficit.  No tongue deviation.  No facial droop.  No slurred speech.   Skin: Skin is warm and dry. No rash noted. No pallor.   Psychiatric:  Normal mood and affect.  Behavior is normal.  Judgment and thought content normal.   Peripheral vascular:  strong pulses on all 4 extremities with no clubbing, no cyanosis, and no edema on the right, minimal on the left.  Genitourinary:  No mansfield catheter in place.    Brief Assessment/Plan:    See above for further detained assessment and plan developed with APC which I have reviewed and/or edited.    Replace the potassium and magnesium per our replacement protocols.  Will repeat the labs in the morning.  Due to the prolonged QTc, which is likely due to the low potassium and magnesium levels, will monitor her on telemetry and repeat the EKG once the electrolytes are at normal levels.    Armando Lombardi MD  [facility] Medicine Team  02/15/19  9:06 AM

## 2019-02-14 NOTE — NURSING NOTE
Tatiana Pina phoned to house supervisor regarding patient being transferred to [facility].  Bed is ready and okay for RN to call report.

## 2019-02-14 NOTE — CONSULTS
Nephrology Consult Note    Referring Provider: Dr Esquivel  Reason for Consultation: hypokalemia    Subjective       History of present illness:  Irish Figueroa is a 58 y.o. female who underwent peripheral angiogram and atherectomy today was found to have k of 2.6 and magnesium of 1.5 on outpatient labs   She denies nausea, vomiting and diarrhea. Her home medication list is not complete but per cath lab nurse she is on lasix and KCL at home. Patient is not aware and doesn't think she is taking ant potassium at home. She denies leg cramps. Denies fever or chills. She has been eating and drinking well. Denies taking any PPIs for heartburn. She is getting mg sulfate 2 gm and has got KCl 50 meq .     History  Past Medical History:   Diagnosis Date   • Diabetes mellitus (CMS/HCC)    • Heart murmur    • Hyperlipidemia    • Hypertension    , Past Surgical History:   Procedure Laterality Date   • APPENDECTOMY     • HYSTERECTOMY     • PERIPHERAL ARTERIAL STENT GRAFT Right 2016   , Family History   Problem Relation Age of Onset   • Heart disease Mother    • Stroke Father    • Diabetes Maternal Grandmother    • Diabetes Paternal Grandmother    , Social History     Tobacco Use   • Smoking status: Never Smoker   • Smokeless tobacco: Never Used   Substance Use Topics   • Alcohol use: No     Frequency: Never   • Drug use: No   , Medications Prior to Admission   Medication Sig Dispense Refill Last Dose   • amLODIPine (NORVASC) 10 MG tablet Take 20 mg by mouth Daily.   Taking   • gabapentin (NEURONTIN) 600 MG tablet Take 600 mg by mouth 3 (Three) Times a Day.   Taking   • HYDROcodone-acetaminophen (NORCO) 5-325 MG per tablet Take 1 tablet by mouth 2 (Two) Times a Day.   Taking   • traMADol (ULTRAM) 50 MG tablet Take 50 mg by mouth 3 (Three) Times a Day.   Taking   • traZODone (DESYREL) 50 MG tablet Take 20 mg by mouth Every Night.   Taking   • vitamin C (ASCORBIC ACID) 500 MG tablet 2 (Two) Times a Day.  0 Taking   • ADMELOG SOLOSTAR 100  UNIT/ML solution pen-injector   1 Taking   • aspirin 81 MG EC tablet Take 81 mg by mouth Daily.   Taking   • B-D UF III MINI PEN NEEDLES 31G X 5 MM misc   5 Taking   • BYDUREON 2 MG pen-injector injection   1 Taking   • cilostazol (PLETAL) 100 MG tablet Take 100 mg by mouth 2 (Two) Times a Day.   Taking   • diclofenac (VOLTAREN) 1 % gel gel APPLY 2 3 GRAMS TO THE AFFECTED AREA 2 3 TIMES DAILY AS NEEDED  2    • furosemide (LASIX) 20 MG tablet   1 Taking   • glipiZIDE (GLUCOTROL) 10 MG tablet   1 Taking   • hydrOXYzine (ATARAX) 25 MG tablet   1 Taking   • ibuprofen (ADVIL,MOTRIN) 600 MG tablet   1 Taking   • Insulin Glargine (BASAGLAR KWIKPEN) 100 UNIT/ML injection pen   1 Taking   • LEVEMIR 100 UNIT/ML injection   1 Taking   • levoFLOXacin (LEVAQUIN) 750 MG tablet   2 Taking   • methocarbamol (ROBAXIN) 500 MG tablet   1 Taking   • potassium chloride (K-DUR) 10 MEQ CR tablet   1 Taking   • PREMARIN 0.9 MG tablet   1 Taking   • rosuvastatin (CRESTOR) 10 MG tablet   1 Taking   , Scheduled Meds:    potassium chloride 40 mEq Oral Once   potassium chloride 10 mEq Intravenous Q1H   sodium chloride 100 mL/hr Intravenous Once   , Continuous Infusions:   , PRN Meds:  •  magnesium sulfate **OR** magnesium sulfate **OR** magnesium sulfate  •  potassium chloride **OR** potassium chloride **OR** potassium chloride  •  potassium chloride **OR** potassium chloride **OR** potassium chloride and Allergies:  Ampicillin and Codeine    Review of Systems  More than 10 point review of systems was done. Pertinent items are noted in HPI, all other systems reviewed and negative    Objective     Vital Signs  Temp:  [99 °F (37.2 °C)] 99 °F (37.2 °C)  Heart Rate:  [94-99] 97  Resp:  [14-20] 18  BP: (166-188)/(73-85) 180/83    No intake/output data recorded.  No intake/output data recorded.    Physical Examination:    General Appearance : alert, appears stated age, cooperative and no distress  Head : normocephalic, without obvious abnormality and  atraumatic  Eyes : conjunctivae and sclerae normal, no icterus, no pallor and PERRLA  Throat : oral mucosa moist  Neck: no adenopathy, suppple, no carotid bruit and no JVD  Lungs : clear to auscultation, respirations regular and unlabored  Heart : regular rhythm & normal rate, normal S1, S2, no murmur, no bradford, no rub   Abdomen :  normal bowel sounds, no masses and soft non-tender  Rectal : Deferred  Extremities : moves extremities well, no redness and no edema  Pulses :  palpable and equal bilaterally  Skin : no bleeding, bruising or rash  Neurologic : orientated to person, place, time, grossly no focal deficitis    Laboratory Data :      WBC WBC   Date Value Ref Range Status   02/14/2019 10.28 4.50 - 12.50 10*3/mm3 Final      HGB Hemoglobin   Date Value Ref Range Status   02/14/2019 11.4 (L) 12.0 - 16.0 g/dL Final      HCT Hematocrit   Date Value Ref Range Status   02/14/2019 34.6 (L) 37.0 - 47.0 % Final      Platlets No results found for: LABPLAT   MCV MCV   Date Value Ref Range Status   02/14/2019 89.9 80.0 - 94.0 fL Final          Sodium Sodium   Date Value Ref Range Status   02/14/2019 137 135 - 153 mmol/L Final      Potassium Potassium   Date Value Ref Range Status   02/14/2019 2.8 (C) 3.5 - 5.3 mmol/L Final   02/14/2019 2.6 (C) 3.5 - 5.3 mmol/L Final      Chloride Chloride   Date Value Ref Range Status   02/14/2019 95 (L) 99 - 112 mmol/L Final      CO2 CO2   Date Value Ref Range Status   02/14/2019 33.1 (H) 24.3 - 31.9 mmol/L Final      BUN BUN   Date Value Ref Range Status   02/14/2019 8 7 - 21 mg/dL Final      Creatinine Creatinine   Date Value Ref Range Status   02/14/2019 0.81 0.43 - 1.29 mg/dL Final      Calcium Calcium   Date Value Ref Range Status   02/14/2019 9.3 7.7 - 10.0 mg/dL Final      PO4 No results found for: CAPO4   Albumin Albumin   Date Value Ref Range Status   02/14/2019 3.50 3.50 - 5.00 g/dL Final      Magnesium Magnesium   Date Value Ref Range Status   02/14/2019 1.5 (L) 1.7 - 2.6  mg/dL Final      Uric Acid No results found for: URICACID     Radiology results :     Imaging Results (last 72 hours)     ** No results found for the last 72 hours. **            Medications:        potassium chloride 40 mEq Oral Once   potassium chloride 10 mEq Intravenous Q1H   sodium chloride 100 mL/hr Intravenous Once          Assessment/Plan       Peripheral vascular disease (CMS/HCC)      1. hypokalemia    2. hypomagnesmeia    3.metabolic alkalsosis    4. Type 2 DM    5. PAD    6. HTN    Will check ABG, aldosterone and renin. Will also check urine K. If she is taking lasix at home it explains all the metabolic derangements. complete med list is pending. Will place on K and Mg replacement protocol and educated patient on high K foods. If potasium is normal should be able to go home in AM    Thanks Dr Esquivel for the consult. We will follow with you.  I discussed the patient's findings and my recommendations with patient and nursing staff    Murray Cardenas MD  02/14/19  3:54 PM

## 2019-02-14 NOTE — NURSING NOTE
Dr. Cardenas to cath lab holding area to see patient.  Explained that RN is waiting on an updated medication list at this time.

## 2019-02-14 NOTE — NURSING NOTE
Dr. Esquivel contacted regarding hospitalist consult.  States Dr. Lombadri is taking hospitalist call at this time.  States he is awaiting his call.

## 2019-02-14 NOTE — NURSING NOTE
Dr. Esquivel notified of K of 2.8 by James Garner RN.  New order to infuse Potassium IV.  See MAR.

## 2019-02-15 VITALS
OXYGEN SATURATION: 97 % | RESPIRATION RATE: 18 BRPM | TEMPERATURE: 98.5 F | HEIGHT: 63 IN | WEIGHT: 157.7 LBS | DIASTOLIC BLOOD PRESSURE: 77 MMHG | BODY MASS INDEX: 27.94 KG/M2 | HEART RATE: 90 BPM | SYSTOLIC BLOOD PRESSURE: 162 MMHG

## 2019-02-15 LAB
ALBUMIN SERPL-MCNC: 3.1 G/DL (ref 3.5–5)
ALBUMIN/GLOB SERPL: 1 G/DL (ref 1.5–2.5)
ALP SERPL-CCNC: 129 U/L (ref 35–104)
ALT SERPL W P-5'-P-CCNC: 20 U/L (ref 10–36)
ANION GAP SERPL CALCULATED.3IONS-SCNC: 5.1 MMOL/L (ref 3.6–11.2)
ANION GAP SERPL CALCULATED.3IONS-SCNC: 7.5 MMOL/L (ref 3.6–11.2)
ANION GAP SERPL CALCULATED.3IONS-SCNC: 8.4 MMOL/L (ref 3.6–11.2)
AST SERPL-CCNC: 34 U/L (ref 10–30)
BASOPHILS # BLD AUTO: 0.03 10*3/MM3 (ref 0–0.3)
BASOPHILS NFR BLD AUTO: 0.3 % (ref 0–2)
BILIRUB SERPL-MCNC: 0.4 MG/DL (ref 0.2–1.8)
BILIRUB UR QL STRIP: NEGATIVE
BUN BLD-MCNC: 7 MG/DL (ref 7–21)
BUN BLD-MCNC: 8 MG/DL (ref 7–21)
BUN BLD-MCNC: 8 MG/DL (ref 7–21)
BUN/CREAT SERPL: 11 (ref 7–25)
BUN/CREAT SERPL: 11.6 (ref 7–25)
BUN/CREAT SERPL: 9.9 (ref 7–25)
CALCIUM SPEC-SCNC: 8.4 MG/DL (ref 7.7–10)
CALCIUM SPEC-SCNC: 8.5 MG/DL (ref 7.7–10)
CALCIUM SPEC-SCNC: 8.9 MG/DL (ref 7.7–10)
CHLORIDE SERPL-SCNC: 98 MMOL/L (ref 99–112)
CLARITY UR: CLEAR
CO2 SERPL-SCNC: 25.6 MMOL/L (ref 24.3–31.9)
CO2 SERPL-SCNC: 29.9 MMOL/L (ref 24.3–31.9)
CO2 SERPL-SCNC: 30.5 MMOL/L (ref 24.3–31.9)
COLOR UR: YELLOW
CREAT BLD-MCNC: 0.69 MG/DL (ref 0.43–1.29)
CREAT BLD-MCNC: 0.71 MG/DL (ref 0.43–1.29)
CREAT BLD-MCNC: 0.73 MG/DL (ref 0.43–1.29)
CREAT UR-MCNC: 68.5 MG/DL
DEPRECATED RDW RBC AUTO: 40 FL (ref 37–54)
EOSINOPHIL # BLD AUTO: 0.38 10*3/MM3 (ref 0–0.7)
EOSINOPHIL NFR BLD AUTO: 3.9 % (ref 0–5)
ERYTHROCYTE [DISTWIDTH] IN BLOOD BY AUTOMATED COUNT: 12.3 % (ref 11.5–14.5)
GFR SERPL CREATININE-BSD FRML MDRD: 82 ML/MIN/1.73
GFR SERPL CREATININE-BSD FRML MDRD: 85 ML/MIN/1.73
GFR SERPL CREATININE-BSD FRML MDRD: 88 ML/MIN/1.73
GLOBULIN UR ELPH-MCNC: 3.1 GM/DL
GLUCOSE BLD-MCNC: 169 MG/DL (ref 70–110)
GLUCOSE BLD-MCNC: 195 MG/DL (ref 70–110)
GLUCOSE BLD-MCNC: 224 MG/DL (ref 70–110)
GLUCOSE BLDC GLUCOMTR-MCNC: 184 MG/DL (ref 70–130)
GLUCOSE UR STRIP-MCNC: ABNORMAL MG/DL
HCT VFR BLD AUTO: 33.7 % (ref 37–47)
HGB BLD-MCNC: 10.9 G/DL (ref 12–16)
HGB UR QL STRIP.AUTO: NEGATIVE
IMM GRANULOCYTES # BLD AUTO: 0.03 10*3/MM3 (ref 0–0.03)
IMM GRANULOCYTES NFR BLD AUTO: 0.3 % (ref 0–0.5)
KETONES UR QL STRIP: NEGATIVE
LEUKOCYTE ESTERASE UR QL STRIP.AUTO: NEGATIVE
LYMPHOCYTES # BLD AUTO: 2.53 10*3/MM3 (ref 1–3)
LYMPHOCYTES NFR BLD AUTO: 26.1 % (ref 21–51)
MAGNESIUM SERPL-MCNC: 2.1 MG/DL (ref 1.7–2.6)
MCH RBC QN AUTO: 30.1 PG (ref 27–33)
MCHC RBC AUTO-ENTMCNC: 32.3 G/DL (ref 33–37)
MCV RBC AUTO: 93.1 FL (ref 80–94)
MONOCYTES # BLD AUTO: 0.61 10*3/MM3 (ref 0.1–0.9)
MONOCYTES NFR BLD AUTO: 6.3 % (ref 0–10)
NEUTROPHILS # BLD AUTO: 6.13 10*3/MM3 (ref 1.4–6.5)
NEUTROPHILS NFR BLD AUTO: 63.1 % (ref 30–70)
NITRITE UR QL STRIP: NEGATIVE
OSMOLALITY SERPL CALC.SUM OF ELEC: 269.5 MOSM/KG (ref 273–305)
OSMOLALITY SERPL CALC.SUM OF ELEC: 270.1 MOSM/KG (ref 273–305)
OSMOLALITY SERPL CALC.SUM OF ELEC: 274.2 MOSM/KG (ref 273–305)
PH UR STRIP.AUTO: 7 [PH] (ref 5–8)
PHOSPHATE SERPL-MCNC: 2.1 MG/DL (ref 2.7–4.5)
PLATELET # BLD AUTO: 413 10*3/MM3 (ref 130–400)
PMV BLD AUTO: 8.8 FL (ref 6–10)
POTASSIUM BLD-SCNC: 2.9 MMOL/L (ref 3.5–5.3)
POTASSIUM BLD-SCNC: 3.2 MMOL/L (ref 3.5–5.3)
POTASSIUM BLD-SCNC: 3.6 MMOL/L (ref 3.5–5.3)
POTASSIUM UR-SCNC: 9.2 MMOL/L
PROT SERPL-MCNC: 6.2 G/DL (ref 6–8)
PROT UR QL STRIP: NEGATIVE
RBC # BLD AUTO: 3.62 10*6/MM3 (ref 4.2–5.4)
SODIUM BLD-SCNC: 132 MMOL/L (ref 135–153)
SODIUM BLD-SCNC: 133 MMOL/L (ref 135–153)
SODIUM BLD-SCNC: 136 MMOL/L (ref 135–153)
SP GR UR STRIP: 1.02 (ref 1–1.03)
TSH SERPL DL<=0.05 MIU/L-ACNC: 2.3 MIU/ML (ref 0.55–4.78)
UROBILINOGEN UR QL STRIP: ABNORMAL
WBC NRBC COR # BLD: 9.71 10*3/MM3 (ref 4.5–12.5)

## 2019-02-15 PROCEDURE — 84133 ASSAY OF URINE POTASSIUM: CPT | Performed by: INTERNAL MEDICINE

## 2019-02-15 PROCEDURE — 94799 UNLISTED PULMONARY SVC/PX: CPT

## 2019-02-15 PROCEDURE — 99217 PR OBSERVATION CARE DISCHARGE MANAGEMENT: CPT | Performed by: PHYSICIAN ASSISTANT

## 2019-02-15 PROCEDURE — 80050 GENERAL HEALTH PANEL: CPT | Performed by: PHYSICIAN ASSISTANT

## 2019-02-15 PROCEDURE — 82962 GLUCOSE BLOOD TEST: CPT

## 2019-02-15 PROCEDURE — 63710000001 INSULIN ASPART PER 5 UNITS: Performed by: PHYSICIAN ASSISTANT

## 2019-02-15 PROCEDURE — 93005 ELECTROCARDIOGRAM TRACING: CPT | Performed by: PHYSICIAN ASSISTANT

## 2019-02-15 PROCEDURE — 81003 URINALYSIS AUTO W/O SCOPE: CPT | Performed by: INTERNAL MEDICINE

## 2019-02-15 PROCEDURE — 80048 BASIC METABOLIC PNL TOTAL CA: CPT | Performed by: INTERNAL MEDICINE

## 2019-02-15 PROCEDURE — G0378 HOSPITAL OBSERVATION PER HR: HCPCS

## 2019-02-15 PROCEDURE — 84100 ASSAY OF PHOSPHORUS: CPT | Performed by: PHYSICIAN ASSISTANT

## 2019-02-15 PROCEDURE — 83735 ASSAY OF MAGNESIUM: CPT | Performed by: INTERNAL MEDICINE

## 2019-02-15 PROCEDURE — 82570 ASSAY OF URINE CREATININE: CPT | Performed by: INTERNAL MEDICINE

## 2019-02-15 PROCEDURE — 93010 ELECTROCARDIOGRAM REPORT: CPT | Performed by: INTERNAL MEDICINE

## 2019-02-15 PROCEDURE — 25010000002 HEPARIN (PORCINE) PER 1000 UNITS: Performed by: INTERNAL MEDICINE

## 2019-02-15 RX ORDER — IBUPROFEN 600 MG/1
600 TABLET ORAL EVERY 8 HOURS PRN
COMMUNITY

## 2019-02-15 RX ORDER — LANOLIN ALCOHOL/MO/W.PET/CERES
400 CREAM (GRAM) TOPICAL 2 TIMES DAILY
Qty: 14 TABLET | Refills: 0 | Status: SHIPPED | OUTPATIENT
Start: 2019-02-15 | End: 2019-02-22

## 2019-02-15 RX ORDER — POTASSIUM CHLORIDE 1.5 G/1.77G
40 POWDER, FOR SOLUTION ORAL EVERY 4 HOURS
Status: DISCONTINUED | OUTPATIENT
Start: 2019-02-15 | End: 2019-02-15 | Stop reason: HOSPADM

## 2019-02-15 RX ORDER — CLOPIDOGREL BISULFATE 75 MG/1
75 TABLET ORAL DAILY
Qty: 30 TABLET | Refills: 0 | Status: SHIPPED | OUTPATIENT
Start: 2019-02-16

## 2019-02-15 RX ORDER — POTASSIUM CHLORIDE 1.5 G/1.77G
20 POWDER, FOR SOLUTION ORAL 2 TIMES DAILY
Qty: 14 PACKET | Refills: 0 | Status: SHIPPED | OUTPATIENT
Start: 2019-02-15 | End: 2019-02-22

## 2019-02-15 RX ADMIN — AMLODIPINE BESYLATE 10 MG: 10 TABLET ORAL at 08:49

## 2019-02-15 RX ADMIN — POTASSIUM CHLORIDE 40 MEQ: 1.5 POWDER, FOR SOLUTION ORAL at 08:50

## 2019-02-15 RX ADMIN — Medication 3 ML: at 08:52

## 2019-02-15 RX ADMIN — INSULIN ASPART 2 UNITS: 100 INJECTION, SOLUTION INTRAVENOUS; SUBCUTANEOUS at 08:50

## 2019-02-15 RX ADMIN — GABAPENTIN 100 MG: 100 CAPSULE ORAL at 05:12

## 2019-02-15 RX ADMIN — ASPIRIN 81 MG: 81 TABLET ORAL at 08:49

## 2019-02-15 RX ADMIN — POTASSIUM CHLORIDE 40 MEQ: 1.5 POWDER, FOR SOLUTION ORAL at 02:16

## 2019-02-15 RX ADMIN — ROSUVASTATIN CALCIUM 10 MG: 10 TABLET, FILM COATED ORAL at 08:49

## 2019-02-15 NOTE — PLAN OF CARE
Problem: Patient Care Overview  Goal: Plan of Care Review  Outcome: Ongoing (interventions implemented as appropriate)    Goal: Individualization and Mutuality  Outcome: Ongoing (interventions implemented as appropriate)    Goal: Discharge Needs Assessment  Outcome: Ongoing (interventions implemented as appropriate)    Goal: Interprofessional Rounds/Family Conf  Outcome: Ongoing (interventions implemented as appropriate)      Problem: Skin Injury Risk (Adult)  Goal: Identify Related Risk Factors and Signs and Symptoms  Outcome: Ongoing (interventions implemented as appropriate)    Goal: Skin Health and Integrity  Outcome: Ongoing (interventions implemented as appropriate)      Problem: Diabetes, Type 2 (Adult)  Goal: Signs and Symptoms of Listed Potential Problems Will be Absent, Minimized or Managed (Diabetes, Type 2)  Outcome: Ongoing (interventions implemented as appropriate)      Problem: Fall Risk (Adult)  Goal: Identify Related Risk Factors and Signs and Symptoms  Outcome: Ongoing (interventions implemented as appropriate)    Goal: Absence of Fall  Outcome: Ongoing (interventions implemented as appropriate)      Problem: Infection, Risk/Actual (Adult)  Goal: Identify Related Risk Factors and Signs and Symptoms  Outcome: Ongoing (interventions implemented as appropriate)    Goal: Infection Prevention/Resolution  Outcome: Ongoing (interventions implemented as appropriate)      Problem: Tissue Perfusion, Ineffective Peripheral (Adult)  Goal: Identify Related Risk Factors and Signs and Symptoms  Outcome: Ongoing (interventions implemented as appropriate)    Goal: Adequate Tissue Perfusion  Outcome: Ongoing (interventions implemented as appropriate)

## 2019-02-15 NOTE — DISCHARGE SUMMARY
[facility] Medicine Services  DISCHARGE SUMMARY    Patient Identification:  Name:  Irish Figueroa  Age:  58 y.o.  Sex:  female  :  1960  MRN:  [id number]  Visit Number:  [id number]    Date of Admission: 2019  Date of Discharge:  2/15/2019    PCP: Jordana Woo MD    Discharging Provider: Gisell Gonzalez PA-C    Admission/Discharge Diagnoses     Discharge Diagnoses:  · Acute hypokalemia and hypomagnesemia, medication induced   · Peripheral arterial disease s/p atherectomy and balloon angioplasty of left distal SFA 2019 in setting of non healing left heel arterial ulceration  · Essential hypertension  · Hyperlipidemia   · Insulin dependent type II diabetes mellitus, HgbA1C 9.20%   · Mild normocytic anemia   · Prolonged QTc interval, 516 ms on admission, slightly improved at time of discharge to 495 ms    Consults/Procedures     Consults:   · Interventional cardiology -- Dr. Esquivel   · Nephrology -- Dr. Cardenas     Procedures Performed:  · 2019: Left lower extremity arteriogram and arthrectomy/balloon angioplasty of left distal SFA -- Dr. Esquivel, interventional cardiology     History of Presenting Illness     Irish Figueroa is a 58 y.o. female with past medical history significant for severe PAD, essential hypertension, hyperlipidemia, and  insulin dependent type II diabetes mellitus that was admitted from the heart catheterization lab s/p peripheral arteriogram with arthrectomy and balloon angioplasty of the left distal SFA per Dr. Esquivel with interventional cardiology. Pre-operative labs revealed significant hypokalemia and hypomagnesemia, patient was subsequently placed in observation status under care of Hospitalist post procedure for electrolyte replacement.     Hospital Course     Patient was placed in observation status on the telemetry floor for electrolyte replacement. Patient was placed of potassium and magnesium replacement protocols. EKG was obtained, which revealed  prolonged QTc interval of 516 ms likely secondary to electrolyte disturbance. Patient was on lasix prior to admission, per report for edematous lower extremities. Patient's lasix was held on admission. Patient's electrolyte disturbance was felt to be due to lasix. Patient was educated on discontinuing lasix, verbalized understanding.     Patient's blood pressure was labile during admission. Patient on Norvasc previously, continued on discharge. Recommend discontinuing in outpatient setting due to history of intermittent lower extremity edema, as Norvasc can worsen edema. Lasix was discontinued as previously mentioned. Patient started on low dose Metoprolol 12.5 mg BID. Patient educated on closely monitor blood pressure with diary logging and following closely with PCP.    On day of discharge, repeat EKG was obtained with slightly improved QTc interval of 495 ms. Patient's potassium was improved from 2.6 to 3.2 overnight, hypomagnesemia was resolved. Patient was replaced again per protocol with oral potassium chloride. Repeat potassium was ordered prior to discharge but patient refused any further lab draws. Patient was initially going to sign out AMA, but did stay long enough for the repeat EKG. Patient educated on importance of repeat lab to ensure her electrolytes were adequate but she still refused. It was felt that she had reached maximal  benefit of hospitalization and was stable for discharge. Patient discharged with 20 mEq potassium chloride BID and 400 mg mag oxide BID. Patient to follow up with PCP in 3-4 days post discharge with repeat CMP and mag level. Patient educated should she develop any palpitations or chest pain to return to ED for evaluation. Patient to follow up with Dr. Esquivel for post arthrectomy/angioplasty follow up in 1 week post discharge. Continue wound care for left heel arterial ulceration per previous instructions from podiatry.     Discharge Vitals/Physical Examination     Vital  Signs:  Temp:  [97.7 °F (36.5 °C)-98.5 °F (36.9 °C)] 98.5 °F (36.9 °C)  Heart Rate:  [78-99] 90  Resp:  [14-20] 18  BP: (153-183)/(69-88) 162/77  Mean Arterial Pressure (Non-Invasive) for the past 24 hrs (Last 3 readings):   Noninvasive MAP (mmHg)   02/15/19 1054 111   02/15/19 0622 103   02/15/19 0246 109     SpO2 Percentage    02/15/19 0246 02/15/19 0622 02/15/19 1054   SpO2: 97% 97% 97%     SpO2:  [91 %-98 %] 97 %  on   ;   Device (Oxygen Therapy): room air    Body mass index is 27.94 kg/m².  Wt Readings from Last 3 Encounters:   02/15/19 71.5 kg (157 lb 11.2 oz)   02/11/19 69.6 kg (153 lb 8 oz)       Physical Exam:  Constitutional: She is oriented to person, place, and time. She appears well-developed and well-nourished.  Non-toxic appearance. No distress.   HENT:   Head: Normocephalic and atraumatic.   Right Ear: External ear normal.   Left Ear: External ear normal.   Nose: Nose normal.   Mouth/Throat: Oropharynx is clear and moist and mucous membranes are normal. No oropharyngeal exudate.   Eyes: Conjunctivae and EOM are normal. Pupils are equal, round, and reactive to light. No scleral icterus.   Neck: Trachea normal and normal range of motion. Neck supple. No JVD present. No muscular tenderness present. Carotid bruit is not present. No tracheal deviation present. No thyromegaly present.   Cardiovascular: Normal rate, regular rhythm, normal heart sounds and intact distal pulses. Exam reveals no gallop and no friction rub.   No murmur heard.  Pulmonary/Chest: Effort normal and breath sounds normal. No respiratory distress. She has no wheezes. She has no rhonchi. She has no rales. She exhibits no tenderness.   Abdominal: Soft. Bowel sounds are normal. She exhibits no distension and no mass. There is no hepatosplenomegaly. There is no tenderness. There is no rebound and no guarding. No hernia.   Musculoskeletal: She exhibits no edema, tenderness or deformity.     Vascular Status -  Her right foot exhibits  abnormal foot edema. Her right foot exhibits normal foot vasculature . Her left foot exhibits abnormal foot edema. Her left foot exhibits normal foot vasculature .     Neurological: She is alert and oriented to person, place, and time. She has normal strength. No cranial nerve deficit or sensory deficit.   Skin: Skin is warm and dry. Capillary refill takes less than 2 seconds. No rash noted. No erythema. No pallor.   Psychiatric: She has a normal mood and affect. Her speech is normal and behavior is normal. Judgment and thought content normal.   Nursing note and vitals reviewed.    Pertinent Laboratory/Radiology Results     Pertinent Laboratory Results:          Results from last 7 days   Lab Units 02/14/19 2026   PH, ARTERIAL pH units 7.492*   PO2 ART mm Hg 63.2*   PCO2, ARTERIAL mm Hg 38.6   HCO3 ART mmol/L 28.9*     Results from last 7 days   Lab Units 02/15/19  0425 02/14/19  0912   WBC 10*3/mm3 9.71 10.28   HEMOGLOBIN g/dL 10.9* 11.4*   HEMATOCRIT % 33.7* 34.6*   MCV fL 93.1 89.9   MCHC g/dL 32.3* 32.9*   PLATELETS 10*3/mm3 413* 389     Results from last 7 days   Lab Units 02/15/19  0425 02/15/19  0104 02/14/19  1828 02/14/19  1210 02/14/19  0912   SODIUM mmol/L 136 133* 133*  --  137   POTASSIUM mmol/L 3.2* 2.9* 3.3* 2.8* 2.6*   MAGNESIUM mg/dL 2.1  --   --  1.5*  --    CHLORIDE mmol/L 98* 98* 95*  --  95*   CO2 mmol/L 30.5 29.9 31.3  --  33.1*   BUN mg/dL 8 8 9  --  8   CREATININE mg/dL 0.69 0.73 0.79  --  0.81   EGFR IF NONAFRICN AM mL/min/1.73 88 82 75  --  73   CALCIUM mg/dL 8.5 8.4 8.8  --  9.3   GLUCOSE mg/dL 169* 195* 380*  --  271*   ALBUMIN g/dL 3.10*  --   --   --  3.50   BILIRUBIN mg/dL 0.4  --   --   --  0.5   ALK PHOS U/L 129*  --   --   --  148*   AST (SGOT) U/L 34*  --   --   --  25   ALT (SGPT) U/L 20  --   --   --  23   Estimated Creatinine Clearance: 86.2 mL/min (by C-G formula based on SCr of 0.69 mg/dL).    Hemoglobin A1C   Date/Time Value Ref Range Status   02/14/2019 0912 9.20 (H) 4.50 -  5.70 % Final     Glucose   Date/Time Value Ref Range Status   02/15/2019 0716 184 (H) 70 - 130 mg/dL Final   02/14/2019 2122 283 (H) 70 - 130 mg/dL Final     Lab Results   Component Value Date    TSH 2.304 02/15/2019     Pain Management Panel     Pain Management Panel Latest Ref Rng & Units 2/15/2019    CREATININE UR mg/dL 68.5        Pertinent Radiology Results:  Peripheral arteriogram 2/14/2019 per Dr. Esquivel       Test Results Pending at Discharge:   Order Current Status    Basic Metabolic Panel Collected (02/15/19 1115)    Aldosterone / Renin Ratio In process    Renin Activity & Aldosterone In process        Discharge Disposition/Discharge Medications/Discharge Appointments     Discharge Disposition:   Home or Self Care    Condition at Discharge:  Stable     DME Prescribed at Discharge:  None     Discharge Diet:  Diet Instructions     Diet: Regular, Consistent Carbohydrate, Cardiac      Discharge Diet:   Regular  Consistent Carbohydrate  Cardiac           Discharge Activity:  Activity Instructions     Activity as Tolerated          Code Status While Inpatient:  Code Status and Medical Interventions:   Ordered at: 02/14/19 1153     Code Status:    CPR     Medical Interventions (Level of Support Prior to Arrest):    Full     Discharge Medications:     Discharge Medications      New Medications      Instructions Start Date   clopidogrel 75 MG tablet  Commonly known as:  PLAVIX   75 mg, Oral, Daily      Magnesium Oxide 400 (240 Mg) MG tablet   400 mg, Oral, 2 Times Daily      metoprolol tartrate 25 MG tablet  Commonly known as:  LOPRESSOR   12.5 mg, Oral, Every 12 Hours Scheduled      potassium chloride 20 MEQ packet  Commonly known as:  KLOR-CON   20 mEq, Oral, 2 Times Daily         Continue These Medications      Instructions Start Date   ADMELOG SOLOSTAR 100 UNIT/ML solution pen-injector  Generic drug:  Insulin Lispro   25-30 Units, Subconjunctival, 3 Times Daily Before Meals      amLODIPine 10 MG tablet  Commonly  known as:  NORVASC   10 mg, Oral, Daily      aspirin 81 MG EC tablet   81 mg, Oral, Daily      gabapentin 600 MG tablet  Commonly known as:  NEURONTIN   600 mg, Oral, 3 Times Daily      glipiZIDE 10 MG tablet  Commonly known as:  GLUCOTROL   10 mg, Oral, Daily      HYDROcodone-acetaminophen 5-325 MG per tablet  Commonly known as:  NORCO   1 tablet, Oral, 2 Times Daily      ibuprofen 600 MG tablet  Commonly known as:  ADVIL,MOTRIN   600 mg, Oral, Every 8 Hours PRN      Insulin Glargine 100 UNIT/ML injection pen  Commonly known as:  BASAGLAR KWIKPEN   60-65 Units, Subcutaneous, 2 Times Daily      methocarbamol 500 MG tablet  Commonly known as:  ROBAXIN   500 mg, Oral, 2 Times Daily      PREMARIN 0.9 MG tablet  Generic drug:  estrogens (conjugated)   0.9 mg, Oral, Daily      rosuvastatin 10 MG tablet  Commonly known as:  CRESTOR   10 mg, Oral, Daily      traMADol 50 MG tablet  Commonly known as:  ULTRAM   50 mg, Oral, 3 Times Daily      traZODone 50 MG tablet  Commonly known as:  DESYREL   50 mg, Oral, Nightly      vitamin C 500 MG tablet  Commonly known as:  ASCORBIC ACID   2 Times Daily         Stop These Medications    furosemide 20 MG tablet  Commonly known as:  LASIX          Discharge Appointments:  Your Scheduled Appointments    Feb 25, 2019  9:30 AM EST  Follow Up with ANGELINA Rivas  [facility] (--) [address] [place] [id number] [phone]   Arrive 15 minutes prior to appointment.    Additional instructions:      Pt  Has  An  Apt  With  Dr hensley  For  Feb 22  At  1:15  With  Comp  And  mag                  Additional Instructions for the Follow-ups that You Need to Schedule     Call MD With Problems / Concerns   As directed      Call PCP or return to the ED with recurrent or worsening symptoms.    Order Comments:  Call PCP or return to the ED with recurrent or worsening symptoms.          Discharge Follow-up with PCP   As directed       Currently  Documented PCP:    Jordana Woo MD    PCP Phone Number:    [phone]     Follow Up Details:  3-4 days with CMP and magnesium level         Discharge Follow-up with Specified Provider: Dr. Esquivel, interventional cardiology; 1 Week   As directed      To:  Dr. Esquivel, interventional cardiology    Follow Up:  1 Week    Follow Up Details:  Post arhtrectomy follow up         Comprehensive Metabolic Panel    Feb 18, 2019 (Approximate)      Results to be sent and followed by PCP: Dr. Jordana Woo MD    Order Comments:  Results to be sent and followed by PCP: Dr. Jordana Woo MD          Magnesium    Feb 18, 2019 (Approximate)      Results to be sent and followed by PCP: Dr. Jordana Woo MD    Order Comments:  Results to be sent and followed by PCP: Dr. Jordana Woo MD                Attestation: I personally discussed the patient's hospital course, disposition, discharge planning, and discharge medications with attending physician, Armando Almazan MD, prior to time of discharge.       Gisell Gonzalez PA-C  Hospitalist Service -- [facility]       02/15/19  11:26 AM    Discharge Time: 30 minutes     Please send a copy of this dictation to the following providers:  Jordana Woo MD

## 2019-02-15 NOTE — PLAN OF CARE
Problem: Tissue Perfusion, Ineffective Peripheral (Adult)  Goal: Identify Related Risk Factors and Signs and Symptoms  Outcome: Ongoing (interventions implemented as appropriate)

## 2019-02-20 LAB
ALDOST SERPL-MCNC: <1 NG/DL (ref 0–30)
ALDOST/RENIN PLAS-RTO: <.2 {RATIO} (ref 0–30)
RENIN PLAS-CCNC: 4.11 NG/ML/HR (ref 0.17–5.38)

## (undated) DEVICE — SHEATH INTRO SUPERSHEATH JWIRE .035 6F 11CM

## (undated) DEVICE — ST EXT IV SMARTSITE 2VLV SP M LL 5ML IV1

## (undated) DEVICE — DEV EPS SPIDERFX 5MM OTW320/RX190CM

## (undated) DEVICE — CANNULA,OXY,ADULT,SUPER SOFT,W/14'TUB,UC: Brand: MEDLINE INDUSTRIES, INC.

## (undated) DEVICE — SHEATH INTRO SUPERSHEATH JWIRE .035 5F 11CM

## (undated) DEVICE — CATH SFT VU RIM BR 5F65CM 0.35

## (undated) DEVICE — RADIFOCUS GLIDEWIRE: Brand: GLIDEWIRE

## (undated) DEVICE — Device

## (undated) DEVICE — CATH ATHRCTMY HAWK1 6F

## (undated) DEVICE — MYNXGRIP 6F/7F: Brand: MYNXGRIP

## (undated) DEVICE — ADULT DISPOSABLE SINGLE-PATIENT USE PULSE OXIMETER SENSOR: Brand: NONIN

## (undated) DEVICE — DRSNG SURESITE WNDW 4X4.5

## (undated) DEVICE — KT MINI ACC 5F .18X40CM SS 21G 7CM

## (undated) DEVICE — PK CATH CARD 70

## (undated) DEVICE — CATH SUP PROC TRAILBLAZER .035IN 150CM

## (undated) DEVICE — GW INQWIRE FC PTFE J/3MM .035 180

## (undated) DEVICE — DESTINATION PERIPHERAL GUIDING SHEATH: Brand: DESTINATION

## (undated) DEVICE — ST INF PRI SMRTSTE 20DRP 2VLV 24ML 117